# Patient Record
Sex: MALE | Race: WHITE | Employment: STUDENT | URBAN - METROPOLITAN AREA
[De-identification: names, ages, dates, MRNs, and addresses within clinical notes are randomized per-mention and may not be internally consistent; named-entity substitution may affect disease eponyms.]

---
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
## 2021-01-11 ENCOUNTER — EKG ENCOUNTER (OUTPATIENT)
Dept: LAB | Age: 21
End: 2021-01-11
Attending: FAMILY MEDICINE
Payer: COMMERCIAL

## 2021-01-11 ENCOUNTER — PATIENT MESSAGE (OUTPATIENT)
Dept: FAMILY MEDICINE CLINIC | Facility: CLINIC | Age: 21
End: 2021-01-11

## 2021-01-11 DIAGNOSIS — R00.2 PALPITATIONS: ICD-10-CM

## 2021-01-11 LAB
ATRIAL RATE: 78 BPM
P AXIS: 68 DEGREES
P-R INTERVAL: 154 MS
Q-T INTERVAL: 392 MS
QRS DURATION: 98 MS
QTC CALCULATION (BEZET): 420 MS
R AXIS: 38 DEGREES
T AXIS: 34 DEGREES
VENTRICULAR RATE: 69 BPM

## 2021-01-11 PROCEDURE — 93010 ELECTROCARDIOGRAM REPORT: CPT | Performed by: INTERNAL MEDICINE

## 2021-01-11 PROCEDURE — 93005 ELECTROCARDIOGRAM TRACING: CPT

## 2021-01-11 NOTE — TELEPHONE ENCOUNTER
From: Raina Story  To: John Corley MD  Sent: 1/11/2021 4:05 PM CST  Subject: Other    Good evening doctor,    I just took the EKG exam, but they said they are waiting to receive the heart monitor since they are out of stock.      Please keep me posted

## 2021-01-14 ENCOUNTER — TELEPHONE (OUTPATIENT)
Dept: FAMILY MEDICINE CLINIC | Facility: CLINIC | Age: 21
End: 2021-01-14

## 2021-01-15 ENCOUNTER — HOSPITAL ENCOUNTER (OUTPATIENT)
Dept: CV DIAGNOSTICS | Facility: HOSPITAL | Age: 21
Discharge: HOME | End: 2021-01-15
Attending: FAMILY MEDICINE
Payer: COMMERCIAL

## 2021-01-15 DIAGNOSIS — R00.2 PALPITATIONS: ICD-10-CM

## 2021-01-15 PROCEDURE — 93272 ECG/REVIEW INTERPRET ONLY: CPT | Performed by: FAMILY MEDICINE

## 2021-01-15 PROCEDURE — 93271 ECG/MONITORING AND ANALYSIS: CPT | Performed by: FAMILY MEDICINE

## 2021-01-15 PROCEDURE — 93270 REMOTE 30 DAY ECG REV/REPORT: CPT | Performed by: FAMILY MEDICINE

## 2021-01-15 NOTE — TELEPHONE ENCOUNTER
Makayla Hightower  You 1 hour ago (7:36 AM)     Andrew Ricks,     Requests for holter monitors are authorized for tracking only. .there is no CPT code required for the authorization.      Thank you,   Adrianna Grounds

## 2021-03-08 ENCOUNTER — TELEPHONE (OUTPATIENT)
Dept: FAMILY MEDICINE CLINIC | Facility: CLINIC | Age: 21
End: 2021-03-08

## 2021-03-08 NOTE — TELEPHONE ENCOUNTER
Please see MyChart pt message below from pt's father, please advise.     MyChart message from pt's father's chart:    Dr Leandro Patel      We would like to discuss with you the results from Mahendra's heart monitor - non billable   Our bill for all of that out of po

## 2021-03-08 NOTE — TELEPHONE ENCOUNTER
I did speak to father regarding 30-day event monitor results. I discussed them with him at great length and they were within normal limits.   I suspect that patient may have underlying anxiety that causes episodes of increased heart rate and father agrees

## 2022-12-21 ENCOUNTER — OFFICE VISIT (OUTPATIENT)
Dept: FAMILY MEDICINE CLINIC | Facility: CLINIC | Age: 22
End: 2022-12-21
Payer: COMMERCIAL

## 2022-12-21 VITALS
WEIGHT: 224.13 LBS | SYSTOLIC BLOOD PRESSURE: 120 MMHG | RESPIRATION RATE: 16 BRPM | DIASTOLIC BLOOD PRESSURE: 70 MMHG | HEIGHT: 68.5 IN | HEART RATE: 69 BPM | TEMPERATURE: 98 F | OXYGEN SATURATION: 98 % | BODY MASS INDEX: 33.58 KG/M2

## 2022-12-21 DIAGNOSIS — Z00.00 ROUTINE ADULT HEALTH MAINTENANCE: Primary | ICD-10-CM

## 2022-12-21 PROCEDURE — 3008F BODY MASS INDEX DOCD: CPT | Performed by: FAMILY MEDICINE

## 2022-12-21 PROCEDURE — 99395 PREV VISIT EST AGE 18-39: CPT | Performed by: FAMILY MEDICINE

## 2022-12-21 PROCEDURE — 3078F DIAST BP <80 MM HG: CPT | Performed by: FAMILY MEDICINE

## 2022-12-21 PROCEDURE — 3074F SYST BP LT 130 MM HG: CPT | Performed by: FAMILY MEDICINE

## 2023-01-20 ENCOUNTER — TELEPHONE (OUTPATIENT)
Dept: FAMILY MEDICINE CLINIC | Facility: CLINIC | Age: 23
End: 2023-01-20
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
## 2024-01-17 ENCOUNTER — OFFICE VISIT (OUTPATIENT)
Dept: FAMILY MEDICINE CLINIC | Facility: CLINIC | Age: 24
End: 2024-01-17
Payer: COMMERCIAL

## 2024-01-17 VITALS
HEART RATE: 61 BPM | TEMPERATURE: 98 F | WEIGHT: 210 LBS | RESPIRATION RATE: 14 BRPM | HEIGHT: 68.5 IN | SYSTOLIC BLOOD PRESSURE: 132 MMHG | BODY MASS INDEX: 31.46 KG/M2 | OXYGEN SATURATION: 97 % | DIASTOLIC BLOOD PRESSURE: 92 MMHG

## 2024-01-17 DIAGNOSIS — Z23 NEED FOR VACCINATION: ICD-10-CM

## 2024-01-17 DIAGNOSIS — Z00.00 ROUTINE GENERAL MEDICAL EXAMINATION AT A HEALTH CARE FACILITY: Primary | ICD-10-CM

## 2024-01-17 PROCEDURE — 90471 IMMUNIZATION ADMIN: CPT | Performed by: FAMILY MEDICINE

## 2024-01-17 PROCEDURE — 90715 TDAP VACCINE 7 YRS/> IM: CPT | Performed by: FAMILY MEDICINE

## 2024-01-17 PROCEDURE — 3075F SYST BP GE 130 - 139MM HG: CPT | Performed by: FAMILY MEDICINE

## 2024-01-17 PROCEDURE — 99395 PREV VISIT EST AGE 18-39: CPT | Performed by: FAMILY MEDICINE

## 2024-01-17 PROCEDURE — 3080F DIAST BP >= 90 MM HG: CPT | Performed by: FAMILY MEDICINE

## 2024-01-17 PROCEDURE — 3008F BODY MASS INDEX DOCD: CPT | Performed by: FAMILY MEDICINE

## 2024-01-20 NOTE — PROGRESS NOTES
Mahendra Powers is a 23 year old male who presents for a complete physical exam.   HPI:   Pt complains of nothing.  Urination changes no  ED symptoms no  Immunizations needed no  Wt Readings from Last 6 Encounters:   01/17/24 210 lb (95.3 kg)   12/21/22 224 lb 2 oz (101.7 kg)   12/21/20 191 lb (86.6 kg) (88%, Z= 1.20)*   02/04/19 179 lb (81.2 kg) (86%, Z= 1.10)*   05/08/18 171 lb (77.6 kg) (84%, Z= 1.01)*   08/03/17 165 lb (74.8 kg) (85%, Z= 1.03)*     * Growth percentiles are based on [facility] (Boys, 2-20 Years) data.     Body mass index is 31.47 kg/m².     Results for orders placed or performed in visit on 01/11/21   EKG 12 Lead   Result Value Ref Range    Ventricular rate 69 BPM    Atrial rate 78 BPM    P-R Interval 154 ms    QRS Duration 98 ms    Q-T Interval 392 ms    QTC Calculation (Bezet) 420 ms    P Axis 68 degrees    R Axis 38 degrees    T Axis 34 degrees       No current outpatient medications on file.      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 2/18/2013    Muscle strain 11/30/2010    L leg    Nonorganic enuresis     recurrent    OM (otitis media)     Pain in thoracic spine     Rib pain     Somatic dysfunction 9/7/2010    R anterior rib    Strep throat     group B    URI (upper respiratory infection)       Past Surgical History:   Procedure Laterality Date    TONSILLECTOMY  approx 2009, 8/1/11    T&A      Family History   Problem Relation Age of Onset    Arthritis Mother     High Blood Pressure Father     Other (Gallbladder) Father     Arthritis Other         grandmother    Diabetes Other         uncles    Other (Heart Disease, Strokes) Other         grandmother    High Blood  Pressure Other         grandmother      Social History:  Social History     Socioeconomic History    Marital status: Single   Tobacco Use    Smoking status: Never    Smokeless tobacco: Never   Vaping Use    Vaping Use: Never used   Substance and Sexual Activity    Alcohol use: No    Drug use: No         REVIEW OF SYSTEMS:   GENERAL: feels well overall, denies fever or chills, denies change in weight  SKIN: denies any unusual skin lesions  EYES: denies changes in vision  HENT: denies upper respiratory symptoms  LUNGS: denies SIMIN, wheezing, cough, orthopnea and PND  CARDIOVASCULAR: denies CP, palpitations, rapid or slow heart rate. Denies AUGUSTIN/lower extremity swelling  GI: denies abdominal pain,denies heartburn, denies n/v/c/d/change in stools/blood in stool/black stool/change in appetite  : denies nocturia or changes in urinary stream, denies scrotal mass/abnormal discharge from urethra  MUSCULOSKELETAL: denies joint or muscle aches or pains  NEURO: denies headaches/dizziness  PSYCH: denies depression or anxiety  HEMATOLOGIC: denies easy bruising/bleeding/anemia/blood clot disorders  ENDOCRINE: denies weight gain/weight loss/enlargement of neck glands/polyuria/polydypsia  ALL/ASTHMA: denies allergic rhinitis/asthma    EXAM:   BP (!) 132/92   Pulse 61   Temp 98.4 °F (36.9 °C) (Temporal)   Resp 14   Ht 5' 8.5\" (1.74 m)   Wt 210 lb (95.3 kg)   SpO2 97%   BMI 31.47 kg/m²   Body mass index is 31.47 kg/m².   GENERAL: NAD, pleasant, well developed, normal voice  SKIN: no rashes, no suspicious moles or lesions  HENT: NCAT, EACs clear b/l, TMs normal b/l, nasal turbinates normal b/l, oropharynx with mmm/clear/normal, gingiva normal, lips normal  EYES: PERRLA, EOMI, conjunctiva non-injected and non-icteric  NECK: supple, no adenopathy/thyromegaly/thyroid nodules/masses  CHEST: no chest tenderness  LUNGS: CTA A/P, no wheezes/ronchi/rales/crackles, normal air excursion  CARDIOVASCULAR: RRR, no murmur, no lower extremity  edema, pedal and femoral pulses 2+ and symmetric b/l  GI: normoactive BS, non-distended, non-tender to palpation, no HSM/masses/pulsations  MUSCULOSKELETAL: Back with normal AROM, no joint swelling, extremities x 4 with normal strength 5/5 and symmetric and with normal AROM/PROM.   EXTREMITIES: no cyanosis, clubbing or edema  NEURO: A&O x3, CN II-XII grossly intact. Reflexes: biceps and patellar 2+ b/l and symmetric. Gait is normal.  PSYCH: normal affect, no apparent thought disorder, average judgement and insight    Immunization History   Administered Date(s) Administered    Covid-19 Vaccine Pfizer 30 mcg/0.3 ml 09/04/2021, 10/02/2021    Covid-19 Vaccine Pfizer Bivalent 30mcg/0.3mL 11/25/2022    Covid-19 Vaccine Pfizer Asher-Sucrose 30 mcg/0.3 ml 04/01/2022    DTAP 07/06/2001, 09/07/2001, 11/10/2001, 05/08/2002, 08/06/2006    FLUZONE 6 months and older PFS 0.5 ml (18217) 10/08/2014, 10/11/2016, 11/13/2019    Flublok Quad Influenza Vaccine (34611) 10/31/2020    Flucelvax 0.5ml Vaccine 10/26/2017, 10/31/2018, 11/25/2022    HEP B 06/07/2001, 07/06/2001, 02/09/2002    HIB 06/07/2001, 07/06/2001, 02/09/2002, 05/09/2005    Hpv Virus Vaccine 9 [address] Im 08/08/2016, 10/11/2016, 02/09/2017    IPV 07/06/2001, 09/07/2001, 02/09/2002, 05/08/2006    Influenza 10/05/2009, 11/09/2010, 10/04/2011, 10/09/2012, 10/24/2013, 10/01/2018, 10/11/2023    MMR 05/11/2002, 05/09/2005    Meningococcal-Menactra 05/28/2015, 08/03/2017    Pneumococcal Vaccine, Conjugate 05/09/2005    TDAP 05/08/2012, 01/17/2024    Varicella 05/11/2004, 05/09/2007       ASSESSMENT AND PLAN:   Mahendra Powers is a 23 year old male who presents for a complete physical exam.     Mahendra was seen today for physical and immunization/injection.    Diagnoses and all orders for this visit:    Routine general medical examination at a health care facility  -     Lipid Panel  -     Comp Metabolic Panel (14)  -     CBC With Differential With Platelet  -     TdaP (Boostrix/Adacel)  Vaccine (> 7 Y)    Need for vaccination  -     Immunization Admin Counseling, 1st Component, 18 years and older  -     TdaP (Boostrix/Adacel) Vaccine (> 7 Y)         Pt educated on immunizations and health maintenance appropriate for age.     The patient verbalizes understanding of these recommendations and agrees to the plan.    The patient is asked to return for complete physical yearly.    There are no Patient Instructions on file for this visit.      Procedures    Lipid Panel    Comp Metabolic Panel (14)    CBC With Differential With Platelet

## 2025-06-14 ENCOUNTER — HOSPITAL ENCOUNTER (EMERGENCY)
Age: 25
Discharge: HOME | End: 2025-06-14
Attending: EMERGENCY MEDICINE

## 2025-06-14 VITALS
TEMPERATURE: 98.2 F | OXYGEN SATURATION: 99 % | RESPIRATION RATE: 14 BRPM | DIASTOLIC BLOOD PRESSURE: 68 MMHG | SYSTOLIC BLOOD PRESSURE: 111 MMHG | HEART RATE: 70 BPM

## 2025-06-14 DIAGNOSIS — F10.929 ALCOHOLIC INTOXICATION WITH COMPLICATION (CMD): Primary | ICD-10-CM

## 2025-06-14 LAB
ALBUMIN SERPL-MCNC: 4.2 G/DL (ref 3.4–5)
ALBUMIN/GLOB SERPL: 1.2 {RATIO} (ref 1–2.4)
ALP SERPL-CCNC: 86 UNITS/L (ref 45–117)
ALT SERPL-CCNC: 21 UNITS/L
ANION GAP SERPL CALC-SCNC: 11 MMOL/L (ref 7–19)
APAP SERPL-MCNC: <2 MCG/ML (ref 10–30)
AST SERPL-CCNC: 24 UNITS/L
BASOPHILS # BLD: 0 K/MCL (ref 0–0.3)
BASOPHILS NFR BLD: 0 %
BILIRUB SERPL-MCNC: 0.3 MG/DL (ref 0.2–1)
BUN SERPL-MCNC: 15 MG/DL (ref 6–20)
BUN/CREAT SERPL: 16 (ref 7–25)
CALCIUM SERPL-MCNC: 8.6 MG/DL (ref 8.4–10.2)
CHLORIDE SERPL-SCNC: 102 MMOL/L (ref 97–110)
CO2 SERPL-SCNC: 26 MMOL/L (ref 21–32)
CREAT SERPL-MCNC: 0.94 MG/DL (ref 0.67–1.17)
DEPRECATED RDW RBC: 42.2 FL (ref 39–50)
EGFRCR SERPLBLD CKD-EPI 2021: >90 ML/MIN/{1.73_M2}
EOSINOPHIL # BLD: 0.1 K/MCL (ref 0–0.5)
EOSINOPHIL NFR BLD: 1 %
ERYTHROCYTE [DISTWIDTH] IN BLOOD: 12.6 % (ref 11–15)
ETHANOL SERPL-MCNC: 288 MG/DL
FASTING DURATION TIME PATIENT: ABNORMAL H
GLOBULIN SER-MCNC: 3.4 G/DL (ref 2–4)
GLUCOSE SERPL-MCNC: 110 MG/DL (ref 70–99)
HCT VFR BLD CALC: 42.5 % (ref 39–51)
HGB BLD-MCNC: 14.7 G/DL (ref 13–17)
IMM GRANULOCYTES # BLD AUTO: 0 K/MCL (ref 0–0.2)
IMM GRANULOCYTES # BLD: 0 %
LYMPHOCYTES # BLD: 1.7 K/MCL (ref 1–4.8)
LYMPHOCYTES NFR BLD: 28 %
MCH RBC QN AUTO: 31.1 PG (ref 26–34)
MCHC RBC AUTO-ENTMCNC: 34.6 G/DL (ref 32–36.5)
MCV RBC AUTO: 90 FL (ref 78–100)
MONOCYTES # BLD: 0.4 K/MCL (ref 0.3–0.9)
MONOCYTES NFR BLD: 7 %
NEUTROPHILS # BLD: 3.6 K/MCL (ref 1.8–7.7)
NEUTROPHILS NFR BLD: 64 %
NRBC BLD MANUAL-RTO: 0 /100 WBC
PLATELET # BLD AUTO: 214 K/MCL (ref 140–450)
POTASSIUM SERPL-SCNC: 3.7 MMOL/L (ref 3.4–5.1)
PROT SERPL-MCNC: 7.6 G/DL (ref 6.4–8.2)
RAINBOW EXTRA TUBES HOLD SPECIMEN: NORMAL
RAINBOW EXTRA TUBES HOLD SPECIMEN: NORMAL
RBC # BLD: 4.72 MIL/MCL (ref 4.5–5.9)
SALICYLATES SERPL-MCNC: <3 MG/DL
SODIUM SERPL-SCNC: 135 MMOL/L (ref 135–145)
WBC # BLD: 5.8 K/MCL (ref 4.2–11)

## 2025-06-14 PROCEDURE — 80179 DRUG ASSAY SALICYLATE: CPT

## 2025-06-14 PROCEDURE — 82077 ASSAY SPEC XCP UR&BREATH IA: CPT

## 2025-06-14 PROCEDURE — 80053 COMPREHEN METABOLIC PANEL: CPT

## 2025-06-14 PROCEDURE — 85025 COMPLETE CBC W/AUTO DIFF WBC: CPT

## 2025-06-14 PROCEDURE — 10002807 HB RX 258

## 2025-06-14 PROCEDURE — 80143 DRUG ASSAY ACETAMINOPHEN: CPT

## 2025-06-14 PROCEDURE — 99284 EMERGENCY DEPT VISIT MOD MDM: CPT | Performed by: EMERGENCY MEDICINE

## 2025-06-14 RX ADMIN — SODIUM CHLORIDE, POTASSIUM CHLORIDE, SODIUM LACTATE AND CALCIUM CHLORIDE 1000 ML: 600; 310; 30; 20 INJECTION, SOLUTION INTRAVENOUS at 02:59

## 2025-06-14 ASSESSMENT — ENCOUNTER SYMPTOMS
UNEXPECTED WEIGHT CHANGE: 0
SLEEP DISTURBANCE: 0
CONFUSION: 1
HALLUCINATIONS: 0
CHEST TIGHTNESS: 0
HEADACHES: 0
BRUISES/BLEEDS EASILY: 0
WOUND: 0
ABDOMINAL PAIN: 0
SHORTNESS OF BREATH: 0
VOMITING: 0
BACK PAIN: 0
AGITATION: 1
POLYDIPSIA: 0
NAUSEA: 0

## (undated) NOTE — LETTER
Date: 5/8/2018    Patient Name: Az Demarco          To Whom it may concern: The above patient was seen at the [facility] for treatment of a medical condition.     This patient should be excused from attending sports from 5/8/18 through 5

## (undated) NOTE — LETTER
Date: 5/8/2018    Patient Name: Sharif Pacheco          To Whom it may concern: The above patient was seen at the [facility] for treatment of a medical condition.     This patient should be excused from attending gym from 5/8/18 through 5/15

## (undated) NOTE — Clinical Note
Date: 2/9/2017    Patient Name: Hollie Licona  [birth year]      To Whom it may concern: The above patient was seen at the [facility] for treatment of a medical condition/illness.     This patient should be excused from attending school on 2/9/1

## (undated) NOTE — Clinical Note
Date: 2/9/2017    Patient Name: Kathia Spence  [birth year]        To Whom it may concern: The above patient was seen at the [facility] for treatment of a medical condition/ illness.      This patient should be excused from attending baseball pra

## (undated) NOTE — MR AVS SNAPSHOT
[facility] [address] [place] [address] [address] [facility]  [phone] [id number] [phone]               Thank you for choosing us for your health care visit with Toni Teixeira DO.   We are glad to serve you and happy to provide you wi protects the body against certain infections and diseases. Vaccines are often given as shots. Common vaccines include those against mumps, measles, and the flu. Researchers are now working on making vaccines to protect the body against cancer.   Types of ca Human Papillomavirus Quadrivalent Vaccine suspension for injection  What is this medicine? HUMAN PAPILLOMAVIRUS VACCINE (HYOO muhn pap  OG h vahy pato shafer SEEN) is a vaccine. It is used to prevent infections of four types of the human papillomavirus. What may interact with this medicine? · other vaccines  What if I miss a dose? All 3 doses of the vaccine should be given within 6 months. Remember to keep appointments for follow-up doses.  Your health care provider will tell you when to return for the n See me if not better. No school the rest of week and no baseball either until 2/13/17. Take care, Dr. Janeth Granados           Follow Up with Our Office     Return if symptoms worsen or fail to improve.       Allergies as of Feb 09, 2017     No Known Aller Sign up for [employer] access for your child. [employer] access allows you to view health information for your child from their recent   visit, view other health information and more.   To sign up or find more information on getting   Proxy Access to your child In addition to 5, 4, 3, 2, 1 families can make small changes in their family routines to help everyone lead healthier active lives.  Try:  o Eating breakfast everyday  o Eating low-fat dairy products like yogurt, milk, and cheese  o Regularly eating meals t

## (undated) NOTE — LETTER
Date: 5/15/2018    Patient Name: Georgette Pacheco          To Whom it may concern: The above patient was seen at the [facility] for treatment of a medical condition. The patient may return to gym/sports without restrictions.       Sincerely

## (undated) NOTE — LETTER
Date: 5/8/2018    Patient Name: Flores Velasquez          To Whom it may concern: The above patient was seen at the [facility] for treatment of a medical condition.     This patient should be excused from attending baseball practice on 5/15/1